# Patient Record
(demographics unavailable — no encounter records)

---

## 2024-10-10 NOTE — PHYSICAL EXAM
[de-identified] : General: Well appearing, no acute distress Neurologic: A&Ox3, No focal deficits Head: NCAT without abrasions, lacerations, or ecchymosis to head, face, or scalp Eyes: No scleral icterus, no gross abnormalities Respiratory: Equal chest wall expansion bilaterally, no accessory muscle use Lymphatic: No lymphadenopathy palpated Skin: Warm and dry Psychiatric: Normal mood and affect   Right shoulder examined. Incisions are clean, dry and intact. No surrounding erythema. No drainage. Active forward flexion to 170 degrees without pain. Sensitive over the biceps tendon and anterior humerus. Passive forward flexion to 180 degrees with pain. 4/5 strength to abduction. 4/5 strength to external rotation. Motor and sensation are intact distally. He has full range of motion of all fingers with capillary refill of <2 seconds throughout. He has good nerve function and median nerve, ulnar, radial, PIN, AIN. He has no sensory deficits over the C5-T1 nerve roots. Radial pulses 2+. Able to make an A-OK sign and thumbs up sign: able to flex/extend thumb, able to cross middle over index finger. Full ROM elbow, wrist, hand.  sens biceps endon. anetioer umerus.   [de-identified] : No new imaging.

## 2024-10-10 NOTE — HISTORY OF PRESENT ILLNESS
[de-identified] : Maurilio Mendez is a 56 male here today for SPO Right shoulder arthroscopy open biceps tenodesis with extensive debridement. DOS: 9/22/2023. He reports some continued symptoms but is overall doing well. States that he reached behind his head about six weeks ago and felt pain to his R shoulder. Also reports that leaving his R shoulder hanging causes exacerbated pain in comparison to it being supported from an arm rest for example.

## 2024-10-10 NOTE — ADDENDUM
[FreeTextEntry1] : Documented by Perla Mulligan acting as a scribe for Dr. Moreno and Roger Damon PA-C on 10/10/2024. All medical record entries made by the Scribe were at my, Dr. Moreno, and Roger Damon's, direction and personally dictated by me on 10/10/2024. I have reviewed the chart and agree that the record accurately reflects my personal performance of the history, physical exam, procedure and imaging.

## 2024-10-10 NOTE — CONSULT LETTER
[Dear  ___] : Dear  [unfilled], [Consult Letter:] : I had the pleasure of evaluating your patient, [unfilled]. [Please see my note below.] : Please see my note below. [Sincerely,] : Sincerely, [FreeTextEntry3] : Dr. Miah Moreno

## 2024-10-10 NOTE — PHYSICAL EXAM
[de-identified] : General: Well appearing, no acute distress Neurologic: A&Ox3, No focal deficits Head: NCAT without abrasions, lacerations, or ecchymosis to head, face, or scalp Eyes: No scleral icterus, no gross abnormalities Respiratory: Equal chest wall expansion bilaterally, no accessory muscle use Lymphatic: No lymphadenopathy palpated Skin: Warm and dry Psychiatric: Normal mood and affect   Right shoulder examined. Incisions are clean, dry and intact. No surrounding erythema. No drainage. Active forward flexion to 170 degrees without pain. Sensitive over the biceps tendon and anterior humerus. Passive forward flexion to 180 degrees with pain. 4/5 strength to abduction. 4/5 strength to external rotation. Motor and sensation are intact distally. He has full range of motion of all fingers with capillary refill of <2 seconds throughout. He has good nerve function and median nerve, ulnar, radial, PIN, AIN. He has no sensory deficits over the C5-T1 nerve roots. Radial pulses 2+. Able to make an A-OK sign and thumbs up sign: able to flex/extend thumb, able to cross middle over index finger. Full ROM elbow, wrist, hand.  sens biceps endon. anetioer umerus.   [de-identified] : No new imaging.

## 2024-10-10 NOTE — DISCUSSION/SUMMARY
[de-identified] : We had a thorough discussion regarding the nature of his pain, the pathophysiology, as well as all treatment options. I discussed operative and non-operative treatment modalities. Discussed the option of a corticosteroid injection for the future and patient will consider this option. Considering the patient's current presentation of pain, physical exam, and radiographs an MRI is indicated at this time. A prescription for this was given to the patient. We will go over the MRI results with him upon obtaining the results in the office and advise him of further treatment options. He agrees with the above plan and all questions were answered.

## 2024-10-10 NOTE — ADDENDUM
[FreeTextEntry1] : Documented by Perla Mulligan acting as a scribe for Dr. Moreno and Roger Damon PA-C on 10/10/2024. All medical record entries made by the Scribe were at my, Dr. Moreno, and Rgoer Damon's, direction and personally dictated by me on 10/10/2024. I have reviewed the chart and agree that the record accurately reflects my personal performance of the history, physical exam, procedure and imaging.

## 2024-10-10 NOTE — REVIEW OF SYSTEMS
[Negative] : Heme/Lymph [FreeTextEntry9] : SPO Right shoulder arthroscopy open biceps tenodesis with extensive debridement DOS: 9/22/2023.

## 2024-10-10 NOTE — DISCUSSION/SUMMARY
[de-identified] : We had a thorough discussion regarding the nature of his pain, the pathophysiology, as well as all treatment options. I discussed operative and non-operative treatment modalities. Discussed the option of a corticosteroid injection for the future and patient will consider this option. Considering the patient's current presentation of pain, physical exam, and radiographs an MRI is indicated at this time. A prescription for this was given to the patient. We will go over the MRI results with him upon obtaining the results in the office and advise him of further treatment options. He agrees with the above plan and all questions were answered.

## 2024-10-10 NOTE — HISTORY OF PRESENT ILLNESS
[de-identified] : Maurilio Mendez is a 56 male here today for SPO Right shoulder arthroscopy open biceps tenodesis with extensive debridement. DOS: 9/22/2023. He reports some continued symptoms but is overall doing well. States that he reached behind his head about six weeks ago and felt pain to his R shoulder. Also reports that leaving his R shoulder hanging causes exacerbated pain in comparison to it being supported from an arm rest for example.

## 2024-11-18 NOTE — DISCUSSION/SUMMARY
[de-identified] : We had a thorough discussion regarding the nature of his pain, the pathophysiology, as well as all treatment options. I discussed operative and non-operative treatment modalities. Discussed the option of a corticosteroid injection for symptom relief. Patient will consider this for his next visit. All questions were answered and the patient verbalized understanding. The patient is in agreement with this treatment plan. Patient will follow up for repeat clinical assessment.

## 2024-11-18 NOTE — PHYSICAL EXAM
[de-identified] : General: Well appearing, no acute distress Neurologic: A&Ox3, No focal deficits Head: NCAT without abrasions, lacerations, or ecchymosis to head, face, or scalp Eyes: No scleral icterus, no gross abnormalities Respiratory: Equal chest wall expansion bilaterally, no accessory muscle use Lymphatic: No lymphadenopathy palpated Skin: Warm and dry Psychiatric: Normal mood and affect   Right shoulder examined. Incisions are clean, dry and intact. No surrounding erythema. No drainage. Active forward flexion to 170 degrees without pain. Sensitive over the biceps tendon and anterior humerus. Passive forward flexion to 180 degrees with pain. 4/5 strength to abduction. 4/5 strength to external rotation. Motor and sensation are intact distally. He has full range of motion of all fingers with capillary refill of <2 seconds throughout. He has good nerve function and median nerve, ulnar, radial, PIN, AIN. He has no sensory deficits over the C5-T1 nerve roots. Radial pulses 2+. Able to make an A-OK sign and thumbs up sign: able to flex/extend thumb, able to cross middle over index finger. Full ROM elbow, wrist, hand.   [de-identified] : MRI [facility] Date of Exam: 11/08/2024 EXAM: MRI-RIGHT SHOULDER NON CONTRAST  IMPRESSION: Status post rotator cuff repair with similar appearance compared to 04/24/2023. No recurrent tear is identified.  Status post biceps tenodesis, new since prior study.  Postsurgical blunting/scarring of the superior labrum, new since prior study.  Mild partial-thickness cartilage loss along the superomedial humeral head, new since prior study.  Mild subacromial subdeltoid bursitis.  Moderate AC joint arthrosis with inferiorly directed bone spur off the distal clavicle.

## 2024-11-18 NOTE — PROCEDURE
[de-identified] : Injection: US guided Right shoulder (Subacromial).      A discussion was had with the patient regarding this procedure and all questions were answered. All risks, benefits and alternatives were discussed. These included but were not limited to bleeding, infection, and allergic reaction. Alcohol was used to clean the skin, and ChloraPrep was used to sterilize and prep the area in the posterior aspect of the right shoulder. Ethyl chloride spray was then used as a topical anesthetic. A 22-gauge needle was used to inject 3cc 1% xylocaine, 2cc 0.25% bupivacaine and 1cc of 40mg/mL triamcinolone acetonide into the right subacromial space. Ultrasound guidance was used for localization. A sterile band aid was then applied. The patient tolerated the procedure well and there were no complications. Post injection instructions were given.

## 2024-11-18 NOTE — PROCEDURE
[de-identified] : Injection: US guided Right shoulder (Subacromial).      A discussion was had with the patient regarding this procedure and all questions were answered. All risks, benefits and alternatives were discussed. These included but were not limited to bleeding, infection, and allergic reaction. Alcohol was used to clean the skin, and ChloraPrep was used to sterilize and prep the area in the posterior aspect of the right shoulder. Ethyl chloride spray was then used as a topical anesthetic. A 22-gauge needle was used to inject 3cc 1% xylocaine, 2cc 0.25% bupivacaine and 1cc of 40mg/mL triamcinolone acetonide into the right subacromial space. Ultrasound guidance was used for localization. A sterile band aid was then applied. The patient tolerated the procedure well and there were no complications. Post injection instructions were given.

## 2024-11-18 NOTE — PHYSICAL EXAM
[de-identified] : General: Well appearing, no acute distress Neurologic: A&Ox3, No focal deficits Head: NCAT without abrasions, lacerations, or ecchymosis to head, face, or scalp Eyes: No scleral icterus, no gross abnormalities Respiratory: Equal chest wall expansion bilaterally, no accessory muscle use Lymphatic: No lymphadenopathy palpated Skin: Warm and dry Psychiatric: Normal mood and affect   Right shoulder examined. Incisions are clean, dry and intact. No surrounding erythema. No drainage. Active forward flexion to 170 degrees without pain. Sensitive over the biceps tendon and anterior humerus. Passive forward flexion to 180 degrees with pain. 4/5 strength to abduction. 4/5 strength to external rotation. Motor and sensation are intact distally. He has full range of motion of all fingers with capillary refill of <2 seconds throughout. He has good nerve function and median nerve, ulnar, radial, PIN, AIN. He has no sensory deficits over the C5-T1 nerve roots. Radial pulses 2+. Able to make an A-OK sign and thumbs up sign: able to flex/extend thumb, able to cross middle over index finger. Full ROM elbow, wrist, hand.   [de-identified] : MRI [facility] Date of Exam: 11/08/2024 EXAM: MRI-RIGHT SHOULDER NON CONTRAST  IMPRESSION: Status post rotator cuff repair with similar appearance compared to 04/24/2023. No recurrent tear is identified.  Status post biceps tenodesis, new since prior study.  Postsurgical blunting/scarring of the superior labrum, new since prior study.  Mild partial-thickness cartilage loss along the superomedial humeral head, new since prior study.  Mild subacromial subdeltoid bursitis.  Moderate AC joint arthrosis with inferiorly directed bone spur off the distal clavicle.

## 2024-11-18 NOTE — DISCUSSION/SUMMARY
[de-identified] : We had a thorough discussion regarding the nature of his pain, the pathophysiology, as well as all treatment options. I discussed operative and non-operative treatment modalities. Discussed the option of a corticosteroid injection for symptom relief.  He would like to proceed and this was performed.  All questions were answered and the patient verbalized understanding. The patient is in agreement with this treatment plan. Patient will follow up for repeat clinical assessment.

## 2024-11-18 NOTE — DISCUSSION/SUMMARY
[de-identified] : We had a thorough discussion regarding the nature of his pain, the pathophysiology, as well as all treatment options. I discussed operative and non-operative treatment modalities. Discussed the option of a corticosteroid injection for symptom relief. Patient will consider this for his next visit. All questions were answered and the patient verbalized understanding. The patient is in agreement with this treatment plan. Patient will follow up for repeat clinical assessment.

## 2024-11-18 NOTE — PROCEDURE
[de-identified] : Injection: US guided Right shoulder (Subacromial).      A discussion was had with the patient regarding this procedure and all questions were answered. All risks, benefits and alternatives were discussed. These included but were not limited to bleeding, infection, and allergic reaction. Alcohol was used to clean the skin, and ChloraPrep was used to sterilize and prep the area in the posterior aspect of the right shoulder. Ethyl chloride spray was then used as a topical anesthetic. A 22-gauge needle was used to inject 3cc 1% xylocaine, 2cc 0.25% bupivacaine and 1cc of 40mg/mL triamcinolone acetonide into the right subacromial space. Ultrasound guidance was used for localization. A sterile band aid was then applied. The patient tolerated the procedure well and there were no complications. Post injection instructions were given.

## 2024-11-18 NOTE — DISCUSSION/SUMMARY
[de-identified] : We had a thorough discussion regarding the nature of his pain, the pathophysiology, as well as all treatment options. I discussed operative and non-operative treatment modalities. Discussed the option of a corticosteroid injection for symptom relief. Patient will consider this for his next visit. All questions were answered and the patient verbalized understanding. The patient is in agreement with this treatment plan. Patient will follow up for repeat clinical assessment.

## 2024-11-18 NOTE — HISTORY OF PRESENT ILLNESS
[de-identified] : RONY MARTINEZ is a 57 year old male being seen for an MRI review of his right shoulder from [facility].

## 2024-11-18 NOTE — HISTORY OF PRESENT ILLNESS
[de-identified] : RONY MARTINEZ is a 57 year old male being seen for an MRI review of his right shoulder from [facility].

## 2024-11-18 NOTE — HISTORY OF PRESENT ILLNESS
[de-identified] : RONY MARTINEZ is a 57 year old male being seen for an follow-up of his right shoulder.  He is having continued pain.  Denies fevers or chills or systemic signs of infection.

## 2024-11-18 NOTE — PHYSICAL EXAM
[de-identified] : General: Well appearing, no acute distress Neurologic: A&Ox3, No focal deficits Head: NCAT without abrasions, lacerations, or ecchymosis to head, face, or scalp Eyes: No scleral icterus, no gross abnormalities Respiratory: Equal chest wall expansion bilaterally, no accessory muscle use Lymphatic: No lymphadenopathy palpated Skin: Warm and dry Psychiatric: Normal mood and affect   Right shoulder examined. Incisions are clean, dry and intact. No surrounding erythema. No drainage. Active forward flexion to 170 degrees without pain. Sensitive over the biceps tendon and anterior humerus. Passive forward flexion to 180 degrees with pain. 4/5 strength to abduction. 4/5 strength to external rotation. Motor and sensation are intact distally. He has full range of motion of all fingers with capillary refill of <2 seconds throughout. He has good nerve function and median nerve, ulnar, radial, PIN, AIN. He has no sensory deficits over the C5-T1 nerve roots. Radial pulses 2+. Able to make an A-OK sign and thumbs up sign: able to flex/extend thumb, able to cross middle over index finger. Full ROM elbow, wrist, hand.   [de-identified] : MRI [facility] Date of Exam: 11/08/2024 EXAM: MRI-RIGHT SHOULDER NON CONTRAST  IMPRESSION: Status post rotator cuff repair with similar appearance compared to 04/24/2023. No recurrent tear is identified.  Status post biceps tenodesis, new since prior study.  Postsurgical blunting/scarring of the superior labrum, new since prior study.  Mild partial-thickness cartilage loss along the superomedial humeral head, new since prior study.  Mild subacromial subdeltoid bursitis.  Moderate AC joint arthrosis with inferiorly directed bone spur off the distal clavicle.

## 2024-11-18 NOTE — ADDENDUM
[FreeTextEntry1] : Documented by Perla Mulligan acting as a scribe for Dr. Moreno and Roger Damon PA-C on 11/14/2024. All medical record entries made by the Scribe were at my, Dr. Moreno, and Roger Damon's, direction and personally dictated by me on 11/14/2024. I have reviewed the chart and agree that the record accurately reflects my personal performance of the history, physical exam, procedure and imaging.

## 2024-11-18 NOTE — PHYSICAL EXAM
[de-identified] : General: Well appearing, no acute distress Neurologic: A&Ox3, No focal deficits Head: NCAT without abrasions, lacerations, or ecchymosis to head, face, or scalp Eyes: No scleral icterus, no gross abnormalities Respiratory: Equal chest wall expansion bilaterally, no accessory muscle use Lymphatic: No lymphadenopathy palpated Skin: Warm and dry Psychiatric: Normal mood and affect   Right shoulder examined. Incisions are clean, dry and intact. No surrounding erythema. No drainage. Active forward flexion to 170 degrees without pain. Sensitive over the biceps tendon and anterior humerus. Passive forward flexion to 180 degrees with pain. 4/5 strength to abduction. 4/5 strength to external rotation. Motor and sensation are intact distally. He has full range of motion of all fingers with capillary refill of <2 seconds throughout. He has good nerve function and median nerve, ulnar, radial, PIN, AIN. He has no sensory deficits over the C5-T1 nerve roots. Radial pulses 2+. Able to make an A-OK sign and thumbs up sign: able to flex/extend thumb, able to cross middle over index finger. Full ROM elbow, wrist, hand.   [de-identified] : MRI [facility] Date of Exam: 11/08/2024 EXAM: MRI-RIGHT SHOULDER NON CONTRAST  IMPRESSION: Status post rotator cuff repair with similar appearance compared to 04/24/2023. No recurrent tear is identified.  Status post biceps tenodesis, new since prior study.  Postsurgical blunting/scarring of the superior labrum, new since prior study.  Mild partial-thickness cartilage loss along the superomedial humeral head, new since prior study.  Mild subacromial subdeltoid bursitis.  Moderate AC joint arthrosis with inferiorly directed bone spur off the distal clavicle.

## 2024-11-18 NOTE — HISTORY OF PRESENT ILLNESS
[de-identified] : RONY MARTINEZ is a 57 year old male being seen for an MRI review of his right shoulder from [facility].

## 2024-11-18 NOTE — PROCEDURE
[de-identified] : Injection: US guided Right shoulder (Subacromial).      A discussion was had with the patient regarding this procedure and all questions were answered. All risks, benefits and alternatives were discussed. These included but were not limited to bleeding, infection, and allergic reaction. Alcohol was used to clean the skin, and ChloraPrep was used to sterilize and prep the area in the posterior aspect of the right shoulder. Ethyl chloride spray was then used as a topical anesthetic. A 22-gauge needle was used to inject 3cc 1% xylocaine, 2cc 0.25% bupivacaine and 1cc of 40mg/mL triamcinolone acetonide into the right subacromial space. Ultrasound guidance was used for localization. A sterile band aid was then applied. The patient tolerated the procedure well and there were no complications. Post injection instructions were given.

## 2024-12-05 NOTE — ADDENDUM
[FreeTextEntry1] : I, Darryn Garcia, acted solely as a scribe for Dr. Darryn De Oliveira on this date 12/05/2024  .   All medical record entries made by the Scribe were at my, Dr. Darryn De Oliveira, direction and personally dictated by me on 12/05/2024 . I have reviewed the chart and agree that the record accurately reflects my personal performance of the history, physical exam, assessment and plan. I have also personally directed, reviewed, and agreed with the chart.

## 2025-04-07 NOTE — REASON FOR VISIT
[Follow-Up Visit] : a follow-up visit for [Workers' Comp: Date of Injury: _______] : This visit is related to worker's compensation. Date of Injury: [unfilled] [Shoulder Pain] : shoulder pain [FreeTextEntry2] : rt shoulder

## 2025-04-07 NOTE — HISTORY OF PRESENT ILLNESS
[de-identified] : RONY MARTINEZ is a 57 year old male being seen for follow-up of his right shoulder.  Of note, SPO Right shoulder arthroscopy open biceps tenodesis with extensive debridement; DOS: 9/22/2023. He is having continued pain. Patient had R shoulder subacromial injection at his visit on 11/14/2024.States that he did not have relief from the injection at that time. He reports that he is having a friction sensation in his R shoulder. He also endorses some burning deep into his shoulder joint. Presents today for repeat evaluation of his symptoms.

## 2025-04-07 NOTE — ADDENDUM
[FreeTextEntry1] : Documented by Perla Mulligan acting as a scribe for Dr. Moreno on 04/07/2025. All medical record entries made by the Scribe were at my, Dr. Moreno's, direction and personally dictated by me on 04/07/2025. I have reviewed the chart and agree that the record accurately reflects my personal performance of the history, physical exam, procedure and imaging.

## 2025-04-07 NOTE — DISCUSSION/SUMMARY
[de-identified] : We had a thorough discussion regarding the nature of his pain, the pathophysiology, as well as all treatment options. I discussed operative and non-operative treatment modalities. Discussed the option of HA injections. Patient is electing to move forward with this and I will have the HA ordered accordingly. Considering the patient's current presentation of pain, physical exam, and radiographs an MRI of the R shoulder is indicated at this time. A prescription for this was given to the patient. We will go over the MRI results with him upon obtaining the results in the office and advise him of further treatment options. He agrees with the above plan and all questions were answered.

## 2025-04-07 NOTE — PHYSICAL EXAM
[de-identified] : General: Well appearing, no acute distress Neurologic: A&Ox3, No focal deficits Head: NCAT without abrasions, lacerations, or ecchymosis to head, face, or scalp Eyes: No scleral icterus, no gross abnormalities Respiratory: Equal chest wall expansion bilaterally, no accessory muscle use Lymphatic: No lymphadenopathy palpated Skin: Warm and dry Psychiatric: Normal mood and affect   Right shoulder examined. Incisions are clean, dry and intact. No surrounding erythema. No drainage. Active forward flexion to 170 degrees without pain. Sensitive over the biceps tendon and anterior humerus. Passive forward flexion to 180 degrees with pain. 4/5 strength to abduction. 4/5 strength to external rotation. Motor and sensation are intact distally. He has full range of motion of all fingers with capillary refill of <2 seconds throughout. He has good nerve function and median nerve, ulnar, radial, PIN, AIN. He has no sensory deficits over the C5-T1 nerve roots. Radial pulses 2+. Able to make an A-OK sign and thumbs up sign: able to flex/extend thumb, able to cross middle over index finger. Full ROM elbow, wrist, hand. [de-identified] : The following radiographs were ordered and read by me during this patient's visit. I reviewed each radiograph in detail with the patient and discussed the findings as highlighted below. 4 views of the R shoulder were obtained today that show no fracture, dislocation. Heterotopic ossification present to the anterior humerus. There is mild to moderate degenerative changes seen. There is no malalignment. No obvious osseous abnormality. Otherwise unremarkable.